# Patient Record
Sex: MALE | Race: ASIAN
[De-identification: names, ages, dates, MRNs, and addresses within clinical notes are randomized per-mention and may not be internally consistent; named-entity substitution may affect disease eponyms.]

---

## 2019-10-03 PROBLEM — Z00.00 ENCOUNTER FOR PREVENTIVE HEALTH EXAMINATION: Status: ACTIVE | Noted: 2019-10-03

## 2019-10-04 ENCOUNTER — APPOINTMENT (OUTPATIENT)
Dept: NEUROSURGERY | Facility: CLINIC | Age: 69
End: 2019-10-04
Payer: MEDICARE

## 2019-10-04 VITALS — BODY MASS INDEX: 23.78 KG/M2 | WEIGHT: 148 LBS | HEIGHT: 66.14 IN

## 2019-10-04 PROCEDURE — 99205 OFFICE O/P NEW HI 60 MIN: CPT

## 2019-10-04 NOTE — ASSESSMENT
[FreeTextEntry1] : 68 year old gentleman with several years hx of progressive low back pain along with neurogenic claudication. No improvement with conservative treatments. Condition is limiting his walking ability to 2 blocks. Even sitting is problematic now after a while, with increased LBP. He also feels heavy on his legs after walking or standing for a brief period.\par \par On exam, motor and sensory functions are intact. SLR are negative bilaterally. Reflexes are diminished bilaterally. No complaints of sphincter disturbance. Range of motion is limited on flexion due to increased LBP.. Gait is steady but somewhat cautious. He has tenderness to palpation in the midline lumbar spine at about the L3-4 level. He also has referred pain to bilateral buttocks and upper posterior thighs.\par \par MRI of the lumbar spine suggested L3-4 grade 1 spondylolisthesis along with severe stenosis.\par \par Discussed with pt and his wife about his conditions and findings along with treatment options at length including surgical decompression and interspinous stabilization. At this time I have referred to see a pain specialist for possible injection therapy. He will undergo X-ray of lumbar spine before returning to see me in the future.

## 2019-11-15 ENCOUNTER — APPOINTMENT (OUTPATIENT)
Dept: NEUROSURGERY | Facility: CLINIC | Age: 69
End: 2019-11-15
Payer: MEDICARE

## 2019-11-15 DIAGNOSIS — M43.17 SPONDYLOLISTHESIS, LUMBOSACRAL REGION: ICD-10-CM

## 2019-11-15 DIAGNOSIS — M48.062 SPINAL STENOSIS, LUMBAR REGION WITH NEUROGENIC CLAUDICATION: ICD-10-CM

## 2019-11-15 PROCEDURE — 99214 OFFICE O/P EST MOD 30 MIN: CPT

## 2019-11-15 NOTE — ASSESSMENT
[FreeTextEntry1] : Underwent injection therapy by Dr. Rosario since I last visited with pt. He has been doing quite well with much improves ability of his ambulation. He has no c/o LBP or neurogenic claudication at all. He is quite pleased with the result of pain management after one injection.\par \par On exam, he is intact with both motor and sensory functions. Gait is steady and fluid. Reflexes are symmetric. ROM of the LB is good. SLR is negative bilaterally.\par \par I reviewed he X-ray of the lumbar spine which suggested mild instability at L3-4 on flexion extension views although the report itself did not mention it. The MRI of the lumbar spine is also re-reviewed today and it suggested severe stenosis at L3-4 with grade one spondylolisthesis.\par \par I will see Mr. Sterling again as needed. I did discuss with him about possible surgical intervention in the future as needed and if he does not respond to conservative Rx.